# Patient Record
Sex: FEMALE | Race: WHITE | Employment: FULL TIME | URBAN - NONMETROPOLITAN AREA
[De-identification: names, ages, dates, MRNs, and addresses within clinical notes are randomized per-mention and may not be internally consistent; named-entity substitution may affect disease eponyms.]

---

## 2017-05-19 ENCOUNTER — HOSPITAL ENCOUNTER (OUTPATIENT)
Dept: ULTRASOUND IMAGING | Age: 27
Discharge: HOME | End: 2017-05-19
Payer: COMMERCIAL

## 2017-05-19 DIAGNOSIS — R59.1 LYMPHADENOPATHY: ICD-10-CM

## 2017-05-19 PROCEDURE — 76536 US EXAM OF HEAD AND NECK: CPT

## 2019-12-11 ENCOUNTER — OFFICE VISIT (OUTPATIENT)
Dept: FAMILY MEDICINE CLINIC | Age: 29
End: 2019-12-11
Payer: COMMERCIAL

## 2019-12-11 ENCOUNTER — HOSPITAL ENCOUNTER (OUTPATIENT)
Age: 29
Discharge: HOME | End: 2019-12-11
Payer: COMMERCIAL

## 2019-12-11 VITALS
HEIGHT: 62 IN | SYSTOLIC BLOOD PRESSURE: 126 MMHG | DIASTOLIC BLOOD PRESSURE: 80 MMHG | BODY MASS INDEX: 39.56 KG/M2 | WEIGHT: 215 LBS

## 2019-12-11 DIAGNOSIS — K62.5 RECTAL BLEED: ICD-10-CM

## 2019-12-11 DIAGNOSIS — K62.5 RECTAL BLEED: Primary | ICD-10-CM

## 2019-12-11 LAB
ABSOLUTE EOS #: 0.51 K/UL (ref 0–0.44)
ABSOLUTE IMMATURE GRANULOCYTE: 0.04 K/UL (ref 0–0.3)
ABSOLUTE LYMPH #: 2.63 K/UL (ref 1.1–3.7)
ABSOLUTE MONO #: 0.92 K/UL (ref 0.1–1.2)
ALBUMIN SERPL-MCNC: 4.4 G/DL (ref 3.5–5.2)
ALBUMIN/GLOBULIN RATIO: 1.6 (ref 1–2.5)
ALP BLD-CCNC: 111 U/L (ref 35–104)
ALT SERPL-CCNC: 22 U/L (ref 5–33)
ANION GAP SERPL CALCULATED.3IONS-SCNC: 9 MMOL/L (ref 9–17)
AST SERPL-CCNC: 21 U/L
BASOPHILS # BLD: 1 % (ref 0–2)
BASOPHILS ABSOLUTE: 0.08 K/UL (ref 0–0.2)
BILIRUB SERPL-MCNC: 0.18 MG/DL (ref 0.3–1.2)
BUN BLDV-MCNC: 10 MG/DL (ref 6–20)
BUN/CREAT BLD: 17 (ref 9–20)
CALCIUM SERPL-MCNC: 9.5 MG/DL (ref 8.6–10.4)
CHLORIDE BLD-SCNC: 102 MMOL/L (ref 98–107)
CO2: 27 MMOL/L (ref 20–31)
CREAT SERPL-MCNC: 0.6 MG/DL (ref 0.5–0.9)
DIFFERENTIAL TYPE: ABNORMAL
EOSINOPHILS RELATIVE PERCENT: 5 % (ref 1–4)
GFR AFRICAN AMERICAN: >60 ML/MIN
GFR NON-AFRICAN AMERICAN: >60 ML/MIN
GFR SERPL CREATININE-BSD FRML MDRD: ABNORMAL ML/MIN/{1.73_M2}
GFR SERPL CREATININE-BSD FRML MDRD: ABNORMAL ML/MIN/{1.73_M2}
GLUCOSE BLD-MCNC: 92 MG/DL (ref 70–99)
HCT VFR BLD CALC: 40.1 % (ref 36.3–47.1)
HEMOGLOBIN: 13 G/DL (ref 11.9–15.1)
IMMATURE GRANULOCYTES: 0 %
LYMPHOCYTES # BLD: 26 % (ref 24–43)
MCH RBC QN AUTO: 28.6 PG (ref 25.2–33.5)
MCHC RBC AUTO-ENTMCNC: 32.4 G/DL (ref 28.4–34.8)
MCV RBC AUTO: 88.1 FL (ref 82.6–102.9)
MONOCYTES # BLD: 9 % (ref 3–12)
NRBC AUTOMATED: 0 PER 100 WBC
PDW BLD-RTO: 13.1 % (ref 11.8–14.4)
PLATELET # BLD: 333 K/UL (ref 138–453)
PLATELET ESTIMATE: ABNORMAL
PMV BLD AUTO: 10.3 FL (ref 8.1–13.5)
POTASSIUM SERPL-SCNC: 4 MMOL/L (ref 3.7–5.3)
RBC # BLD: 4.55 M/UL (ref 3.95–5.11)
RBC # BLD: ABNORMAL 10*6/UL
SEG NEUTROPHILS: 59 % (ref 36–65)
SEGMENTED NEUTROPHILS ABSOLUTE COUNT: 6.07 K/UL (ref 1.5–8.1)
SODIUM BLD-SCNC: 138 MMOL/L (ref 135–144)
TOTAL PROTEIN: 7.1 G/DL (ref 6.4–8.3)
WBC # BLD: 10.3 K/UL (ref 3.5–11.3)
WBC # BLD: ABNORMAL 10*3/UL

## 2019-12-11 PROCEDURE — 36415 COLL VENOUS BLD VENIPUNCTURE: CPT

## 2019-12-11 PROCEDURE — 85025 COMPLETE CBC W/AUTO DIFF WBC: CPT

## 2019-12-11 PROCEDURE — 99204 OFFICE O/P NEW MOD 45 MIN: CPT | Performed by: FAMILY MEDICINE

## 2019-12-11 PROCEDURE — 80053 COMPREHEN METABOLIC PANEL: CPT

## 2019-12-11 ASSESSMENT — PATIENT HEALTH QUESTIONNAIRE - PHQ9
1. LITTLE INTEREST OR PLEASURE IN DOING THINGS: 0
2. FEELING DOWN, DEPRESSED OR HOPELESS: 0
SUM OF ALL RESPONSES TO PHQ QUESTIONS 1-9: 0
SUM OF ALL RESPONSES TO PHQ QUESTIONS 1-9: 0
SUM OF ALL RESPONSES TO PHQ9 QUESTIONS 1 & 2: 0

## 2019-12-13 ENCOUNTER — TELEPHONE (OUTPATIENT)
Dept: FAMILY MEDICINE CLINIC | Age: 29
End: 2019-12-13

## 2019-12-16 ENCOUNTER — TELEPHONE (OUTPATIENT)
Dept: GASTROENTEROLOGY | Facility: CLINIC | Age: 29
End: 2019-12-16

## 2019-12-16 DIAGNOSIS — K62.5 RECTAL BLEEDING: Primary | ICD-10-CM

## 2019-12-20 PROBLEM — K62.5 RECTAL BLEEDING: Status: ACTIVE | Noted: 2019-12-20

## 2019-12-20 RX ORDER — SODIUM, POTASSIUM,MAG SULFATES 17.5-3.13G
SOLUTION, RECONSTITUTED, ORAL ORAL
Qty: 2 BOTTLE | Refills: 0 | Status: ON HOLD | OUTPATIENT
Start: 2019-12-20 | End: 2019-12-30 | Stop reason: HOSPADM

## 2019-12-30 ENCOUNTER — ANESTHESIA (OUTPATIENT)
Dept: OPERATING ROOM | Age: 29
End: 2019-12-30
Payer: COMMERCIAL

## 2019-12-30 ENCOUNTER — HOSPITAL ENCOUNTER (OUTPATIENT)
Age: 29
Setting detail: OUTPATIENT SURGERY
Discharge: HOME | End: 2019-12-30
Attending: INTERNAL MEDICINE | Admitting: INTERNAL MEDICINE
Payer: COMMERCIAL

## 2019-12-30 ENCOUNTER — ANESTHESIA EVENT (OUTPATIENT)
Dept: OPERATING ROOM | Age: 29
End: 2019-12-30
Payer: COMMERCIAL

## 2019-12-30 VITALS
OXYGEN SATURATION: 98 % | TEMPERATURE: 97.9 F | HEIGHT: 62 IN | BODY MASS INDEX: 34.96 KG/M2 | HEART RATE: 65 BPM | SYSTOLIC BLOOD PRESSURE: 122 MMHG | DIASTOLIC BLOOD PRESSURE: 72 MMHG | WEIGHT: 190 LBS | RESPIRATION RATE: 16 BRPM

## 2019-12-30 VITALS
OXYGEN SATURATION: 97 % | RESPIRATION RATE: 13 BRPM | DIASTOLIC BLOOD PRESSURE: 82 MMHG | SYSTOLIC BLOOD PRESSURE: 124 MMHG

## 2019-12-30 PROCEDURE — 45378 DIAGNOSTIC COLONOSCOPY: CPT | Performed by: INTERNAL MEDICINE

## 2019-12-30 PROCEDURE — 2500000003 HC RX 250 WO HCPCS: Performed by: NURSE ANESTHETIST, CERTIFIED REGISTERED

## 2019-12-30 PROCEDURE — 2580000003 HC RX 258: Performed by: INTERNAL MEDICINE

## 2019-12-30 PROCEDURE — 3609027000 HC COLONOSCOPY: Performed by: INTERNAL MEDICINE

## 2019-12-30 PROCEDURE — 7100000011 HC PHASE II RECOVERY - ADDTL 15 MIN: Performed by: INTERNAL MEDICINE

## 2019-12-30 PROCEDURE — 6360000002 HC RX W HCPCS: Performed by: NURSE ANESTHETIST, CERTIFIED REGISTERED

## 2019-12-30 PROCEDURE — 3700000000 HC ANESTHESIA ATTENDED CARE: Performed by: INTERNAL MEDICINE

## 2019-12-30 PROCEDURE — 2709999900 HC NON-CHARGEABLE SUPPLY: Performed by: INTERNAL MEDICINE

## 2019-12-30 PROCEDURE — 7100000010 HC PHASE II RECOVERY - FIRST 15 MIN: Performed by: INTERNAL MEDICINE

## 2019-12-30 PROCEDURE — 3700000001 HC ADD 15 MINUTES (ANESTHESIA): Performed by: INTERNAL MEDICINE

## 2019-12-30 RX ORDER — SODIUM CHLORIDE, SODIUM LACTATE, POTASSIUM CHLORIDE, CALCIUM CHLORIDE 600; 310; 30; 20 MG/100ML; MG/100ML; MG/100ML; MG/100ML
INJECTION, SOLUTION INTRAVENOUS CONTINUOUS
Status: DISCONTINUED | OUTPATIENT
Start: 2019-12-30 | End: 2019-12-30 | Stop reason: HOSPADM

## 2019-12-30 RX ORDER — LIDOCAINE HYDROCHLORIDE 20 MG/ML
INJECTION, SOLUTION EPIDURAL; INFILTRATION; INTRACAUDAL; PERINEURAL PRN
Status: DISCONTINUED | OUTPATIENT
Start: 2019-12-30 | End: 2019-12-30 | Stop reason: SDUPTHER

## 2019-12-30 RX ORDER — PROPOFOL 10 MG/ML
INJECTION, EMULSION INTRAVENOUS PRN
Status: DISCONTINUED | OUTPATIENT
Start: 2019-12-30 | End: 2019-12-30 | Stop reason: SDUPTHER

## 2019-12-30 RX ORDER — PROPOFOL 10 MG/ML
INJECTION, EMULSION INTRAVENOUS CONTINUOUS PRN
Status: DISCONTINUED | OUTPATIENT
Start: 2019-12-30 | End: 2019-12-30 | Stop reason: SDUPTHER

## 2019-12-30 RX ADMIN — LIDOCAINE HYDROCHLORIDE 100 MG: 20 INJECTION, SOLUTION EPIDURAL; INFILTRATION; INTRACAUDAL at 10:09

## 2019-12-30 RX ADMIN — PROPOFOL 200 MCG/KG/MIN: 10 INJECTION, EMULSION INTRAVENOUS at 10:09

## 2019-12-30 RX ADMIN — PROPOFOL 50 MG: 10 INJECTION, EMULSION INTRAVENOUS at 10:15

## 2019-12-30 RX ADMIN — PROPOFOL 100 MG: 10 INJECTION, EMULSION INTRAVENOUS at 10:09

## 2019-12-30 RX ADMIN — SODIUM CHLORIDE, POTASSIUM CHLORIDE, SODIUM LACTATE AND CALCIUM CHLORIDE: 600; 310; 30; 20 INJECTION, SOLUTION INTRAVENOUS at 09:26

## 2019-12-30 ASSESSMENT — PAIN SCALES - GENERAL
PAINLEVEL_OUTOF10: 0
PAINLEVEL_OUTOF10: 0

## 2019-12-30 ASSESSMENT — LIFESTYLE VARIABLES: SMOKING_STATUS: 0

## 2019-12-30 ASSESSMENT — PAIN - FUNCTIONAL ASSESSMENT: PAIN_FUNCTIONAL_ASSESSMENT: 0-10

## 2020-12-08 ENCOUNTER — OFFICE VISIT (OUTPATIENT)
Dept: FAMILY MEDICINE CLINIC | Age: 30
End: 2020-12-08
Payer: COMMERCIAL

## 2020-12-08 VITALS
WEIGHT: 209 LBS | BODY MASS INDEX: 38.46 KG/M2 | DIASTOLIC BLOOD PRESSURE: 74 MMHG | HEIGHT: 62 IN | SYSTOLIC BLOOD PRESSURE: 116 MMHG

## 2020-12-08 PROCEDURE — 99395 PREV VISIT EST AGE 18-39: CPT | Performed by: FAMILY MEDICINE

## 2020-12-08 SDOH — ECONOMIC STABILITY: INCOME INSECURITY: HOW HARD IS IT FOR YOU TO PAY FOR THE VERY BASICS LIKE FOOD, HOUSING, MEDICAL CARE, AND HEATING?: NOT HARD AT ALL

## 2020-12-08 SDOH — ECONOMIC STABILITY: FOOD INSECURITY: WITHIN THE PAST 12 MONTHS, YOU WORRIED THAT YOUR FOOD WOULD RUN OUT BEFORE YOU GOT MONEY TO BUY MORE.: NEVER TRUE

## 2020-12-08 SDOH — ECONOMIC STABILITY: FOOD INSECURITY: WITHIN THE PAST 12 MONTHS, THE FOOD YOU BOUGHT JUST DIDN'T LAST AND YOU DIDN'T HAVE MONEY TO GET MORE.: NEVER TRUE

## 2020-12-08 ASSESSMENT — PATIENT HEALTH QUESTIONNAIRE - PHQ9
SUM OF ALL RESPONSES TO PHQ QUESTIONS 1-9: 0
2. FEELING DOWN, DEPRESSED OR HOPELESS: 0
SUM OF ALL RESPONSES TO PHQ9 QUESTIONS 1 & 2: 0
SUM OF ALL RESPONSES TO PHQ QUESTIONS 1-9: 0
1. LITTLE INTEREST OR PLEASURE IN DOING THINGS: 0
SUM OF ALL RESPONSES TO PHQ QUESTIONS 1-9: 0

## 2020-12-08 NOTE — PATIENT INSTRUCTIONS
PLAN:  She continues on allegra for management of allergic rhinitis. We discuss her hysterectomy last year at [facility] due to pain that she was having. Her GYN said it appeared that she had had a bowel obstruction at some point causing her fallopian tubes to be adhered to her bowels. She had a colonoscopy last year and denies any recent flare ups in the hemorrhoids. She has a family history of breast cancer (maternal grandmother and mother). I recommend that she make sure she is doing self breast exams routinely to evaluate for lumps/masses. Genetic testing may also be prudent. Her GYN recommended mammograms starting at age 28. I would recommend that she get breast MRI alternatively as in younger women, the mammograms are more difficult to read. Weight is her biggest risk at this time. I encourage her to have a long-term goal to keep her BMI down below 30 through aerobic activity. We discuss correlations between sleep apnea and obesity. She denies any difficulty sleeping at this time. If the dermatofibroma become bothersome, I can excise these. I will have her schedule to come back and have the sebaceous cysts excised 2 at a time. I will see her back in 1 year or sooner. SURVEY:    You may be receiving a survey from [employer] regarding your visit today. Please complete the survey to enable us to provide the highest quality of care to you and your family. If you cannot score us a very good on any question, please call the office to discuss how we could of made your experience a very good one. Thank you.

## 2020-12-08 NOTE — PROGRESS NOTES
Rebekah WILLAMS, ISAURA, am scribing for and in the presence of Dr. Genoveva Cunningham. 12/08/2020 8:40 am [address] [facility] [address] [facility]  [place] [address]  Dept: [phone]    Katheryn Mancia is a 27 y.o. female here for Annual Exam      HPI:  Pt. Presents for annual wellness. Prior to Admission medications    Medication Sig Start Date End Date Taking? Authorizing Provider   Chlorpheniramine Maleate (ALLERGY PO) Take by mouth daily    Historical Provider, MD       ROS:  General Constitutional: Denies chills. Denies fever. Denies headache. Denies lightheadedness. Ophthalmologic: Denies blurred vision. ENT: Denies nasal congestion. Denies sore throat. Denies ear pain and pressure. Respiratory: Denies cough. Denies shortness of breath. Denies wheezing. Cardiovascular: Denies chest pain at rest. Denies irregular heartbeat. Denies palpitations. Gastrointestinal: Denies abdominal pain. Denies blood in the stool. Denies constipation. Denies diarrhea. Denies nausea. Denies vomiting. Genitourinary: Denies blood in the urine. Denies difficulty urinating. Denies frequent urination. Denies painful urination. Denies urinary incontinence. Musculoskeletal: Denies muscle aches. Denies painful joints. Denies swollen joints. Peripheral Vascular: Denies pain/cramping in legs after exertion. Skin: Denies dry skin. Denies itching. Denies rash. Getting more sebaceous cysts on scalp, arm, leg. Get irritated and inflamed when brushing and washing hair  Neurologic: Denies falls. Denies dizziness. Denies fainting. Denies tingling/numbness. Psychiatric: Denies sleep disturbance. Denies anxiety. Denies depressed mood.     Past Surgical History:   Procedure Laterality Date    COLONOSCOPY N/A 12/30/2019    COLONOSCOPY DIAGNOSTIC performed by Davis Reese MD at [address] [place] [address]  12/30/2019    -hemorrhoids    COLPOSCOPY  8/14/2012    HYSTERECTOMY  04/2018    Dr. Maria Teresa De La Cruz ovaries present    KNEE SURGERY      Right       Family History   Problem Relation Age of Onset    Breast Cancer Mother     Breast Cancer Maternal Grandmother        Past Medical History:   Diagnosis Date    ASCUS with positive high risk HPV 7/26/2012      Social History     Tobacco Use    Smoking status: Never Smoker    Smokeless tobacco: Never Used   Substance Use Topics    Alcohol use: Yes     Alcohol/week: 0.0 standard drinks     Comment: occ      Current Outpatient Medications   Medication Sig Dispense Refill    Chlorpheniramine Maleate (ALLERGY PO) Take by mouth daily       No current facility-administered medications for this visit. Allergies   Allergen Reactions    Tape [facility] Tape]      Paper tape       PHYSICAL EXAM:    /74   Ht 5' 2\" (1.575 m)   Wt 209 lb (94.8 kg)   LMP 10/03/2016   BMI 38.23 kg/m²   Wt Readings from Last 3 Encounters:   12/08/20 209 lb (94.8 kg)   12/30/19 190 lb (86.2 kg)   12/11/19 215 lb (97.5 kg)     BP Readings from Last 3 Encounters:   12/08/20 116/74   12/30/19 122/72   12/30/19 124/82       General Appearance: in no acute distress, well developed, well nourished. Eyes: pupils equal, round reactive to light and accommodation. Ears: normal canal and TM's. Nose: nares patent, no lesions. Oral Cavity: mucosa moist.  Throat: clear. Neck/Thyroid: neck supple, full range of motion, no cervical lymphadenopathy, no thyromegaly or carotid bruits. Skin: warm and dry. posterior temporal region 1 cm sebaceous cyst, right midline frontal region, 3 on midline and to right frontal parietal region. Dermatofibroma 1/2 cm on right arm posteriorly above elbow. Dermatofibroma right posterior thigh above popliteal space. Heart: regular rate and rhythm. No murmurs. S1, S2 normal, no gallops. Rate: 80   Lungs: clear to auscultation bilaterally. Abdomen: bowel sounds present, soft, nontender, nondistended, no masses or organomegaly. Obese.    Musculoskeletal: normal, full range of motion in knees and hips, no swelling or tenderness. Extremities: no cyanosis or edema. Peripheral Pulses: 2+ throughout, symetric. Neurologic: nonfocal, motor strength normal upper and lower extremities, sensory exam intact. Psych: normal affect, speech fluent. ASSESSMENT:   Diagnosis Orders   1. Wellness examination  Lipid Panel    Hemoglobin A1C    Comprehensive Metabolic Panel    CBC With Auto Differential   2. Internal hemorrhoids     3. Class 2 obesity due to excess calories without serious comorbidity with body mass index (BMI) of 37.0 to 37.9 in adult     4. Family history of breast cancer in mother     11. Sebaceous cyst     6. Dermatofibroma         PLAN:  She continues on allegra for management of allergic rhinitis. We discuss her hysterectomy last year at [facility] due to pain that she was having. Her GYN said it appeared that she had had a bowel obstruction at some point causing her fallopian tubes to be adhered to her bowels. She had a colonoscopy last year and denies any recent flare ups in the hemorrhoids. She has a family history of breast cancer (maternal grandmother and mother). I recommend that she make sure she is doing self breast exams routinely to evaluate for lumps/masses. Genetic testing may also be prudent. Her GYN recommended mammograms starting at age 28. I would recommend that she get breast MRI alternatively as in younger women, the mammograms are more difficult to read. Weight is her biggest risk at this time. I encourage her to have a long-term goal to keep her BMI down below 30 through aerobic activity. We discuss correlations between sleep apnea and obesity. She denies any difficulty sleeping at this time. If the dermatofibroma become bothersome, I can excise these. I will have her schedule to come back and have the sebaceous cysts excised 2 at a time. I will see her back in 1 year or sooner.    Orders Placed This Encounter Procedures    Lipid Panel     Standing Status:   Future     Standing Expiration Date:   12/8/2021     Order Specific Question:   Is Patient Fasting?/# of Hours     Answer:   no fasting required    Hemoglobin A1C     Standing Status:   Future     Standing Expiration Date:   12/8/2021    Comprehensive Metabolic Panel     Standing Status:   Future     Standing Expiration Date:   12/8/2021    CBC With Auto Differential     Standing Status:   Future     Standing Expiration Date:   12/8/2021     No orders of the defined types were placed in this encounter. I, Dr. Jyotsna Staley, personally performed the services described in this documentation as scribed by ISAURA Domínguez in my presence, and is both accurate and complete.

## 2021-04-20 ENCOUNTER — PROCEDURE VISIT (OUTPATIENT)
Dept: FAMILY MEDICINE CLINIC | Age: 31
End: 2021-04-20
Payer: COMMERCIAL

## 2021-04-20 ENCOUNTER — HOSPITAL ENCOUNTER (OUTPATIENT)
Age: 31
Setting detail: SPECIMEN
Discharge: HOME | End: 2021-04-20
Payer: COMMERCIAL

## 2021-04-20 DIAGNOSIS — D23.9 DERMATOFIBROMA: Primary | ICD-10-CM

## 2021-04-20 PROCEDURE — 11401 EXC TR-EXT B9+MARG 0.6-1 CM: CPT | Performed by: FAMILY MEDICINE

## 2021-04-20 PROCEDURE — 88305 TISSUE EXAM BY PATHOLOGIST: CPT

## 2021-04-21 LAB
ABSOLUTE BASO #: 0.1 X10E9/L (ref 0–0.2)
ABSOLUTE EOS #: 0.3 X10E9/L (ref 0–0.4)
ABSOLUTE LYMPH #: 2.4 X10E9/L (ref 1–3.5)
ABSOLUTE MONO #: 0.6 X10E9/L (ref 0–0.9)
ABSOLUTE NEUT #: 5.3 X10E9/L (ref 1.5–6.6)
ALBUMIN SERPL-MCNC: 4.5 G/DL (ref 3.2–5.3)
ALK PHOSPHATASE: 115 U/L (ref 39–130)
ALT SERPL-CCNC: 20 U/L (ref 0–31)
ANION GAP SERPL CALCULATED.3IONS-SCNC: 8 MMOL/L (ref 5–15)
AST SERPL-CCNC: 19 U/L (ref 0–41)
AVERAGE GLUCOSE: 105 MG/DL
BASOPHILS RELATIVE PERCENT: 0.9 %
BILIRUB SERPL-MCNC: 0.5 MG/DL (ref 0.3–1.2)
BUN BLDV-MCNC: 10 MG/DL (ref 5–23)
CALCIUM SERPL-MCNC: 9 MG/DL (ref 8.5–10.5)
CHLORIDE BLD-SCNC: 105 MMOL/L (ref 98–109)
CHOLESTEROL/HDL RATIO: 4.1 (ref 1–5)
CHOLESTEROL: 219 MG/DL (ref 150–200)
CO2: 27 MMOL/L (ref 22–32)
CREAT SERPL-MCNC: 0.71 MG/DL (ref 0.4–1)
EGFR AFRICAN AMERICAN: >60 ML/MIN/1.73SQ.M
EGFR IF NONAFRICAN AMERICAN: >60 ML/MIN/1.73SQ.M
EOSINOPHILS RELATIVE PERCENT: 3.7 %
GLUCOSE: 95 MG/DL (ref 65–99)
HBA1C MFR BLD: 5.3 % (ref 4.4–6.4)
HCT VFR BLD CALC: 42.2 % (ref 35–47)
HDLC SERPL-MCNC: 54 MG/DL
HEMOGLOBIN: 13.7 G/DL (ref 11.7–15.5)
LDL CHOLESTEROL CALCULATED: 145 MG/DL
LDL/HDL RATIO: 2.7
LYMPHOCYTE %: 27.4 %
MCH RBC QN AUTO: 28 PG (ref 27–34)
MCHC RBC AUTO-ENTMCNC: 32.4 G/DL (ref 32–36)
MCV RBC AUTO: 86 FL (ref 80–100)
MONOCYTES # BLD: 7.1 %
NEUTROPHILS RELATIVE PERCENT: 60.9 %
PDW BLD-RTO: 14 % (ref 11.5–15)
PLATELETS: 316 X10E9/L (ref 150–450)
PMV BLD AUTO: 9.4 FL (ref 7–12)
POTASSIUM SERPL-SCNC: 3.7 MMOL/L (ref 3.5–5)
RBC: 4.89 X10E12/L (ref 3.8–5.2)
SODIUM BLD-SCNC: 140 MMOL/L (ref 134–146)
TOTAL PROTEIN: 7 G/DL (ref 6–8)
TRIGL SERPL-MCNC: 102 MG/DL (ref 27–150)
VLDLC SERPL CALC-MCNC: 20 MG/DL (ref 0–30)
WBC: 8.6 X10E9/L (ref 4–11)

## 2021-04-22 LAB — DERMATOLOGY PATHOLOGY REPORT: NORMAL

## 2021-04-29 ENCOUNTER — PROCEDURE VISIT (OUTPATIENT)
Dept: FAMILY MEDICINE CLINIC | Age: 31
End: 2021-04-29
Payer: COMMERCIAL

## 2021-04-29 VITALS
TEMPERATURE: 97.3 F | OXYGEN SATURATION: 98 % | HEIGHT: 62 IN | HEART RATE: 64 BPM | SYSTOLIC BLOOD PRESSURE: 122 MMHG | WEIGHT: 213 LBS | DIASTOLIC BLOOD PRESSURE: 64 MMHG | RESPIRATION RATE: 17 BRPM | BODY MASS INDEX: 39.2 KG/M2

## 2021-04-29 DIAGNOSIS — Z48.02 VISIT FOR SUTURE REMOVAL: Primary | ICD-10-CM

## 2021-04-29 PROCEDURE — S0630 REMOVAL OF SUTURES: HCPCS | Performed by: NURSE PRACTITIONER

## 2021-04-29 NOTE — PROGRESS NOTES
Name: Nino Dakins  : 1990         Chief Complaint:     Chief Complaint   Patient presents with    Suture / Staple Removal      had cyst removed from behind R knee, 4 4-0 sutures placed    Other     Pt. states she has another cyst she is wanting removed on lateral side of R knee       History of Present Illness:      Nino Dakins is a 32 y.o.  female who presents with Suture / Staple Removal ( had cyst removed from behind R knee, 4 4-0 sutures placed) and Other (Pt. states she has another cyst she is wanting removed on lateral side of R knee)      HPI      Past Medical History:     Past Medical History:   Diagnosis Date    ASCUS with positive high risk HPV 2012      Reviewed all health maintenance requirements and ordered appropriate tests  Health Maintenance Due   Topic Date Due    Hepatitis C screen  Never done    Varicella vaccine (1 of 2 - 2-dose childhood series) Never done    COVID-19 Vaccine (1) Never done    DTaP/Tdap/Td vaccine (1 - Tdap) Never done    Cervical cancer screen  2018       Past Surgical History:     Past Surgical History:   Procedure Laterality Date    COLONOSCOPY N/A 2019    COLONOSCOPY DIAGNOSTIC performed by Indra Wheeler MD at [address] [place] [address]  2019    -hemorrhoids    COLPOSCOPY  2012    HYSTERECTOMY  2018    Dr. Silas Saldivar ovaries present    KNEE SURGERY      Right        Medications:       Prior to Admission medications    Medication Sig Start Date End Date Taking? Authorizing Provider   Chlorpheniramine Maleate (ALLERGY PO) Take by mouth daily   Yes Historical Provider, MD        Allergies:       Tape [place]  tape]    Social History:     Tobacco:    reports that she has never smoked. She has never used smokeless tobacco.  Alcohol:      reports current alcohol use. Drug Use:  reports no history of drug use.     Family History:     Family History   Problem Relation Age of Onset   Shanthi Antunez Breast Cancer Mother    Shanthi Antunez Breast Cancer Maternal Grandmother        Review of Systems:     Positive and Negative as described in HPI    Review of Systems    Physical Exam:   Vitals:  /64 (Site: Left Upper Arm, Position: Sitting, Cuff Size: Large Adult)   Pulse 64   Temp 97.3 °F (36.3 °C) (Tympanic)   Resp 17   Ht 5' 2\" (1.575 m)   Wt 213 lb (96.6 kg)   LMP 10/03/2016   SpO2 98%   BMI 38.96 kg/m²     Physical Exam    Data:     Lab Results   Component Value Date     04/20/2021    K 3.7 04/20/2021     04/20/2021    CO2 27 04/20/2021    BUN 10 04/20/2021    CREATININE 0.71 04/20/2021    GLUCOSE 95 04/20/2021    PROT 7.0 04/20/2021    LABALBU 4.5 04/20/2021    BILITOT 0.5 04/20/2021    ALKPHOS 115 04/20/2021    ALKPHOS 111 12/11/2019    AST 19 04/20/2021    ALT 20 04/20/2021     Lab Results   Component Value Date    WBC 8.6 04/20/2021    WBC 10.3 12/11/2019    RBC 4.89 04/20/2021    HGB 13.7 04/20/2021    HCT 42.2 04/20/2021    MCV 86 04/20/2021    MCH 28.0 04/20/2021    MCHC 32.4 04/20/2021    RDW 14.0 04/20/2021     04/20/2021     12/11/2019    MPV 9.4 04/20/2021     No results found for: TSH  Lab Results   Component Value Date    CHOL 219 04/20/2021    HDL 54 04/20/2021    LABA1C 5.3 04/20/2021       Assessment/Plan:     {No diagnosis found. (Refresh or delete this SmartLink)}        Completed Refills   Requested Prescriptions      No prescriptions requested or ordered in this encounter       No orders of the defined types were placed in this encounter. No results found for this visit on 04/29/21. No follow-ups on file.     Electronically signed by GITA Goetz CNP on 04/29/21 at 8:18 AM.

## 2021-04-29 NOTE — PROGRESS NOTES
[address] [address]  [place] [address]  Dept: [phone]    SUBJECTIVE:    Chief Complaint   Patient presents with    Suture / Staple Removal     4/20 had cyst removed from behind R knee, 4 4-0 sutures placed    Other     Pt. states she has another cyst she is wanting removed on lateral side of R knee       HPI:  Patient presents today for suture removal. She had cyst removed behind right knee on 4/20/21. 4 4-0 sutures were placed. She denies concerns with her sutures today. Denies redness, bleeding, or discharge. She has been caring for her sutures by showering and leaving the area uncovered. Denies pain. ROS:  she denies any issues with the sutures    OBJECTIVE:    EXAM:  Derm: Well approximated incision without surrounding erythema, discharge, edema, or bleeding present right popliteal space. ASSESSMENT:     Diagnosis Orders   1. Visit for suture removal           PLAN:    The sutures were removed without difficulty and the patient tolerated the procedure well. Reviewed signs and symptoms of infection and when to notify office. She will schedule appointment to return to have second cyst removed by Dr. Victor Manuel Ivy.     Performed by: Vini Ramírez CNP

## 2021-05-03 ENCOUNTER — PROCEDURE VISIT (OUTPATIENT)
Dept: FAMILY MEDICINE CLINIC | Age: 31
End: 2021-05-03
Payer: COMMERCIAL

## 2021-05-03 ENCOUNTER — HOSPITAL ENCOUNTER (OUTPATIENT)
Age: 31
Setting detail: SPECIMEN
Discharge: HOME | End: 2021-05-03
Payer: COMMERCIAL

## 2021-05-03 DIAGNOSIS — L72.3 SEBACEOUS CYST: Primary | ICD-10-CM

## 2021-05-03 PROCEDURE — 88305 TISSUE EXAM BY PATHOLOGIST: CPT

## 2021-05-03 PROCEDURE — 11104 PUNCH BX SKIN SINGLE LESION: CPT | Performed by: FAMILY MEDICINE

## 2021-05-03 NOTE — PROGRESS NOTES
Adiel WILLAMS RMA, am scribing for and in the presence of Dr. Rhiannon Pineda. 05/03/2021 3:00 pm [address]  [place] [address]  Dept: [phone]    EXCISIONAL BIOPSY PROCEDURE NOTE    PRE-OP DIAGNOSIS:  Dermatofibroma                                          The lesions has been irritated, inflamed and painful               PROCEDURE:  Skin Lesion Excision(s)    Lesion description: nodular lesion R lateral thigh. INDICATIONS:  Soto Watson is a 32 y.o. female who presents for minor skin surgery for changing and concerning skin lesion(s). The patient understands all risks, benefits, indications, potential complications, and alternatives, and freely consents for the procedure. The patient also understands the option of performing no surgery, the risk for scarring, and the technique of the procedure. TECHNIQUE:  After informed consent was obtained and after the skin was prepped with Betadine and alcohol and draped in the usual sterile fashion Lidocaine with epinephrine was used as local anesthesia. An incision was made with a 4mm punch biopsy and 10 blade to excise   Bleeding was controlled with ferric subsulfate. A dressing was applied and wound care instructions were provided. she tolerated the procedure well and without complications. The patient will be alert for any signs of cutaneous infection and will follow up as instructed. Plan:  1. Instructed to keep the wound dry and covered for 24-48h and clean thereafter. 2. Warning signs of infection were reviewed.     3. We will call the patient with the biopsy results

## 2021-05-05 LAB — DERMATOLOGY PATHOLOGY REPORT: NORMAL

## 2021-05-11 ENCOUNTER — PROCEDURE VISIT (OUTPATIENT)
Dept: FAMILY MEDICINE CLINIC | Age: 31
End: 2021-05-11
Payer: COMMERCIAL

## 2021-05-11 ENCOUNTER — HOSPITAL ENCOUNTER (OUTPATIENT)
Age: 31
Setting detail: SPECIMEN
Discharge: HOME | End: 2021-05-11
Payer: COMMERCIAL

## 2021-05-11 DIAGNOSIS — D23.61 DERMATOFIBROMA OF UPPER ARM, RIGHT: Primary | ICD-10-CM

## 2021-05-11 DIAGNOSIS — L72.3 SEBACEOUS CYST: ICD-10-CM

## 2021-05-11 PROCEDURE — 11104 PUNCH BX SKIN SINGLE LESION: CPT | Performed by: FAMILY MEDICINE

## 2021-05-11 PROCEDURE — 88305 TISSUE EXAM BY PATHOLOGIST: CPT

## 2021-05-11 NOTE — PROGRESS NOTES
I, ISAURA Leonard, am scribing for and in the presence of Dr. Shanel Soto. 05/11/2021 3:31 pm [facility]  [place] [address]  Dept: [phone]    EXCISIONAL BIOPSY PROCEDURE NOTE    PRE-OP DIAGNOSIS:  dermatofibroma                                          The lesions has been irritated, inflamed and painful               PROCEDURE:  Skin Lesion Excision(s)    Lesion description: 6 cm dermatofibroma, R upper posterior arm    INDICATIONS:  Remington Draper is a 32 y.o. female who presents for minor skin surgery for changing and concerning skin lesion(s). The patient understands all risks, benefits, indications, potential complications, and alternatives, and freely consents for the procedure. The patient also understands the option of performing no surgery, the risk for scarring, and the technique of the procedure. TECHNIQUE:  After informed consent was obtained and after the skin was prepped with Betadine and alcohol and draped in the usual sterile fashion Lidocaine with epinephrine was used as local anesthesia. An incision was made with a 6 mm punch, the lesion was excised and sent for pathologic evaluation. 3 4-0 prolene sutures were placed to approximate the wound edges. A dressing was applied and wound care instructions were provided. she tolerated the procedure well and without complications. The patient will be alert for any signs of cutaneous infection and will follow up as instructed. Plan:  1. Instructed to keep the wound dry and covered for 24-48h and clean thereafter. 2. Warning signs of infection were reviewed. 3. We will call the patient with the biopsy results    I will refer her to Dr. Brian Joyner for excision of the sebaceous cysts on her scalp.

## 2021-05-14 LAB — DERMATOLOGY PATHOLOGY REPORT: NORMAL

## 2021-07-14 ENCOUNTER — OFFICE VISIT (OUTPATIENT)
Dept: SURGERY | Age: 31
End: 2021-07-14
Payer: COMMERCIAL

## 2021-07-14 VITALS
SYSTOLIC BLOOD PRESSURE: 146 MMHG | WEIGHT: 209 LBS | DIASTOLIC BLOOD PRESSURE: 95 MMHG | BODY MASS INDEX: 38.46 KG/M2 | HEIGHT: 62 IN | HEART RATE: 71 BPM | TEMPERATURE: 97.2 F

## 2021-07-14 DIAGNOSIS — L72.11 PILAR AND TRICHILEMMAL CYSTS: Primary | ICD-10-CM

## 2021-07-14 DIAGNOSIS — L72.12 PILAR AND TRICHILEMMAL CYSTS: Primary | ICD-10-CM

## 2021-07-14 PROCEDURE — 99203 OFFICE O/P NEW LOW 30 MIN: CPT | Performed by: SURGERY

## 2021-07-14 NOTE — PROGRESS NOTES
noncontributory  Cardiovascular:  Completed and, except as mentioned above, was negative or noncontributory  Gastrointestinal: Completed and, except as mentioned above, was negative or noncontributory  Genito-Urinary:  Completed and, except as mentioned above, was negative or noncontributory  Musculoskeletal:  Completed and, except as mentioned above, was negative or noncontributory  Neurological:  Completed and, except as mentioned above, was negative or noncontributory  Dermatological:  Completed and, except as mentioned above, was negative or noncontributory    Allergies: Tape [adhesive tape]    Current Meds:  Current Outpatient Medications:     Chlorpheniramine Maleate (ALLERGY PO), Take by mouth daily, Disp: , Rfl:     Physical Exam:  Vital signs and Nurse's note reviewed. BP (!) 146/95   Pulse 71   Temp 97.2 °F (36.2 °C)   Ht 5' 2\" (1.575 m)   Wt 209 lb (94.8 kg)   LMP 10/03/2016   BMI 38.23 kg/m²    height is 5' 2\" (1.575 m) and weight is 209 lb (94.8 kg). Her temperature is 97.2 °F (36.2 °C). Her blood pressure is 146/95 (abnormal) and her pulse is 71. Gen:  A&Ox3, NAD. Pleasant and cooperative. HEENT: PERRLA, EOMI, no scleral icterus. Multiple large scattered scalp pilar cysts varying in size from 1-3 cm. More than 10.   Neck:  no goiter  CVS: Regular rate and rhythm  Resp: Good bilateral air entry, no active wheezing, no labored breathing  Ext: Moves all extremities, no gross focal motor deficits  Skin: No erythema or ulcerations     Labs:   Lab Results   Component Value Date    WBC 8.6 04/20/2021    WBC 10.3 12/11/2019    HGB 13.7 04/20/2021    HCT 42.2 04/20/2021    MCV 86 04/20/2021     04/20/2021     12/11/2019     Lab Results   Component Value Date     04/20/2021    K 3.7 04/20/2021     04/20/2021    CO2 27 04/20/2021    BUN 10 04/20/2021    CREATININE 0.71 04/20/2021    GLUCOSE 95 04/20/2021    CALCIUM 9.0 04/20/2021     Lab Results   Component Value Date    [facility] 115 04/20/2021    ALKPHOS 111 12/11/2019    ALT 20 04/20/2021    AST 19 04/20/2021    PROT 7.0 04/20/2021    BILITOT 0.5 04/20/2021    LABALBU 4.5 04/20/2021       Impressions/Recommendations:     Multiple scattered scalp pilar cysts of varying sizes and locations. More than 10. Will re-assess in pre-op. Plan 2 hours, general anesthesia. Staples. On cart. Maximize local. Informed consent obtained. Thank you Marilyn Castillo MD for allowing me to participate in the care of your patients.      Jeff Keller DO, MPH, [facility] [place] [address] Surgery, [address] [place] [address] Rd office [phone]  [place] office [phone]

## 2021-07-29 NOTE — PROGRESS NOTES
Patient instructed on the pre-operative, intra-operative, and post-operative process. Patient's surgery arrival time to the hospital and surgery start time confirmed for the day of surgery. Patient instructed on NPO status. Medication instructions reviewed with patient. Pre operative instruction sheet reviewed and given to patient in PAT. CHG skin prep instructions reviewed with the patient via telephone. Pt instructed to stop taking all aspirin products for 1 week.

## 2021-08-09 ENCOUNTER — HOSPITAL ENCOUNTER (OUTPATIENT)
Dept: PREADMISSION TESTING | Age: 31
Setting detail: SPECIMEN
Discharge: HOME | End: 2021-08-13
Payer: COMMERCIAL

## 2021-08-09 DIAGNOSIS — Z01.818 PREOP TESTING: Primary | ICD-10-CM

## 2021-08-09 PROCEDURE — U0005 INFEC AGEN DETEC AMPLI PROBE: HCPCS

## 2021-08-09 PROCEDURE — U0003 INFECTIOUS AGENT DETECTION BY NUCLEIC ACID (DNA OR RNA); SEVERE ACUTE RESPIRATORY SYNDROME CORONAVIRUS 2 (SARS-COV-2) (CORONAVIRUS DISEASE [COVID-19]), AMPLIFIED PROBE TECHNIQUE, MAKING USE OF HIGH THROUGHPUT TECHNOLOGIES AS DESCRIBED BY CMS-2020-01-R: HCPCS

## 2021-08-09 PROCEDURE — C9803 HOPD COVID-19 SPEC COLLECT: HCPCS

## 2021-08-10 LAB
SARS-COV-2: NORMAL
SARS-COV-2: NOT DETECTED
SOURCE: NORMAL

## 2021-08-11 ENCOUNTER — ANESTHESIA EVENT (OUTPATIENT)
Dept: OPERATING ROOM | Age: 31
End: 2021-08-11
Payer: COMMERCIAL

## 2021-08-12 ENCOUNTER — HOSPITAL ENCOUNTER (OUTPATIENT)
Age: 31
Setting detail: OUTPATIENT SURGERY
Discharge: HOME | End: 2021-08-12
Attending: SURGERY | Admitting: SURGERY
Payer: COMMERCIAL

## 2021-08-12 ENCOUNTER — ANESTHESIA (OUTPATIENT)
Dept: OPERATING ROOM | Age: 31
End: 2021-08-12
Payer: COMMERCIAL

## 2021-08-12 VITALS
HEIGHT: 62 IN | RESPIRATION RATE: 16 BRPM | SYSTOLIC BLOOD PRESSURE: 144 MMHG | WEIGHT: 204.8 LBS | TEMPERATURE: 97.8 F | DIASTOLIC BLOOD PRESSURE: 87 MMHG | OXYGEN SATURATION: 100 % | BODY MASS INDEX: 37.69 KG/M2 | HEART RATE: 66 BPM

## 2021-08-12 VITALS
RESPIRATION RATE: 14 BRPM | SYSTOLIC BLOOD PRESSURE: 112 MMHG | TEMPERATURE: 98.2 F | DIASTOLIC BLOOD PRESSURE: 63 MMHG | OXYGEN SATURATION: 98 %

## 2021-08-12 DIAGNOSIS — G89.18 ACUTE POSTOPERATIVE PAIN: Primary | ICD-10-CM

## 2021-08-12 PROBLEM — L72.12 PILAR AND TRICHILEMMAL CYSTS: Status: ACTIVE | Noted: 2021-08-12

## 2021-08-12 PROBLEM — L72.11 PILAR AND TRICHILEMMAL CYSTS: Status: ACTIVE | Noted: 2021-08-12

## 2021-08-12 PROCEDURE — 2500000003 HC RX 250 WO HCPCS: Performed by: NURSE ANESTHETIST, CERTIFIED REGISTERED

## 2021-08-12 PROCEDURE — 11420 EXC H-F-NK-SP B9+MARG 0.5/<: CPT | Performed by: SURGERY

## 2021-08-12 PROCEDURE — 2580000003 HC RX 258: Performed by: SURGERY

## 2021-08-12 PROCEDURE — 11421 EXC H-F-NK-SP B9+MARG 0.6-1: CPT | Performed by: SURGERY

## 2021-08-12 PROCEDURE — 7100000010 HC PHASE II RECOVERY - FIRST 15 MIN: Performed by: SURGERY

## 2021-08-12 PROCEDURE — 7100000011 HC PHASE II RECOVERY - ADDTL 15 MIN: Performed by: SURGERY

## 2021-08-12 PROCEDURE — 3600000002 HC SURGERY LEVEL 2 BASE: Performed by: SURGERY

## 2021-08-12 PROCEDURE — 11422 EXC H-F-NK-SP B9+MARG 1.1-2: CPT | Performed by: SURGERY

## 2021-08-12 PROCEDURE — 2500000003 HC RX 250 WO HCPCS: Performed by: SURGERY

## 2021-08-12 PROCEDURE — 6360000002 HC RX W HCPCS: Performed by: SURGERY

## 2021-08-12 PROCEDURE — 2709999900 HC NON-CHARGEABLE SUPPLY: Performed by: SURGERY

## 2021-08-12 PROCEDURE — 3700000000 HC ANESTHESIA ATTENDED CARE: Performed by: SURGERY

## 2021-08-12 PROCEDURE — 6360000002 HC RX W HCPCS: Performed by: NURSE ANESTHETIST, CERTIFIED REGISTERED

## 2021-08-12 PROCEDURE — 3600000012 HC SURGERY LEVEL 2 ADDTL 15MIN: Performed by: SURGERY

## 2021-08-12 PROCEDURE — 6370000000 HC RX 637 (ALT 250 FOR IP): Performed by: SURGERY

## 2021-08-12 PROCEDURE — 88304 TISSUE EXAM BY PATHOLOGIST: CPT

## 2021-08-12 PROCEDURE — 3700000001 HC ADD 15 MINUTES (ANESTHESIA): Performed by: SURGERY

## 2021-08-12 RX ORDER — ACETAMINOPHEN 500 MG
1000 TABLET ORAL
Status: COMPLETED | OUTPATIENT
Start: 2021-08-12 | End: 2021-08-12

## 2021-08-12 RX ORDER — HYDROCODONE BITARTRATE AND ACETAMINOPHEN 5; 325 MG/1; MG/1
1 TABLET ORAL
Status: DISCONTINUED | OUTPATIENT
Start: 2021-08-12 | End: 2021-08-12 | Stop reason: HOSPADM

## 2021-08-12 RX ORDER — SODIUM CHLORIDE, SODIUM LACTATE, POTASSIUM CHLORIDE, CALCIUM CHLORIDE 600; 310; 30; 20 MG/100ML; MG/100ML; MG/100ML; MG/100ML
INJECTION, SOLUTION INTRAVENOUS CONTINUOUS
Status: DISCONTINUED | OUTPATIENT
Start: 2021-08-12 | End: 2021-08-12 | Stop reason: HOSPADM

## 2021-08-12 RX ORDER — DEXAMETHASONE SODIUM PHOSPHATE 4 MG/ML
4 INJECTION, SOLUTION INTRA-ARTICULAR; INTRALESIONAL; INTRAMUSCULAR; INTRAVENOUS; SOFT TISSUE
Status: DISCONTINUED | OUTPATIENT
Start: 2021-08-12 | End: 2021-08-12 | Stop reason: HOSPADM

## 2021-08-12 RX ORDER — IBUPROFEN 800 MG/1
800 TABLET ORAL 2 TIMES DAILY PRN
Qty: 15 TABLET | Refills: 0 | Status: SHIPPED | OUTPATIENT
Start: 2021-08-12 | End: 2021-08-12 | Stop reason: HOSPADM

## 2021-08-12 RX ORDER — KETOROLAC TROMETHAMINE 30 MG/ML
INJECTION, SOLUTION INTRAMUSCULAR; INTRAVENOUS PRN
Status: DISCONTINUED | OUTPATIENT
Start: 2021-08-12 | End: 2021-08-12 | Stop reason: SDUPTHER

## 2021-08-12 RX ORDER — PROPOFOL 10 MG/ML
INJECTION, EMULSION INTRAVENOUS CONTINUOUS PRN
Status: DISCONTINUED | OUTPATIENT
Start: 2021-08-12 | End: 2021-08-12 | Stop reason: SDUPTHER

## 2021-08-12 RX ORDER — IBUPROFEN 800 MG/1
800 TABLET ORAL 2 TIMES DAILY PRN
Qty: 20 TABLET | Refills: 0 | Status: SHIPPED | OUTPATIENT
Start: 2021-08-12

## 2021-08-12 RX ORDER — FENTANYL CITRATE 50 UG/ML
INJECTION, SOLUTION INTRAMUSCULAR; INTRAVENOUS PRN
Status: DISCONTINUED | OUTPATIENT
Start: 2021-08-12 | End: 2021-08-12 | Stop reason: SDUPTHER

## 2021-08-12 RX ORDER — FENTANYL CITRATE 50 UG/ML
50 INJECTION, SOLUTION INTRAMUSCULAR; INTRAVENOUS EVERY 5 MIN PRN
Status: DISCONTINUED | OUTPATIENT
Start: 2021-08-12 | End: 2021-08-12 | Stop reason: HOSPADM

## 2021-08-12 RX ORDER — PROPOFOL 10 MG/ML
INJECTION, EMULSION INTRAVENOUS PRN
Status: DISCONTINUED | OUTPATIENT
Start: 2021-08-12 | End: 2021-08-12 | Stop reason: SDUPTHER

## 2021-08-12 RX ORDER — BUPIVACAINE HYDROCHLORIDE AND EPINEPHRINE 2.5; 5 MG/ML; UG/ML
INJECTION, SOLUTION EPIDURAL; INFILTRATION; INTRACAUDAL; PERINEURAL PRN
Status: DISCONTINUED | OUTPATIENT
Start: 2021-08-12 | End: 2021-08-12 | Stop reason: ALTCHOICE

## 2021-08-12 RX ORDER — ONDANSETRON 2 MG/ML
4 INJECTION INTRAMUSCULAR; INTRAVENOUS
Status: DISCONTINUED | OUTPATIENT
Start: 2021-08-12 | End: 2021-08-12 | Stop reason: HOSPADM

## 2021-08-12 RX ORDER — MAGNESIUM HYDROXIDE 1200 MG/15ML
LIQUID ORAL CONTINUOUS PRN
Status: COMPLETED | OUTPATIENT
Start: 2021-08-12 | End: 2021-08-12

## 2021-08-12 RX ORDER — HYDROCODONE BITARTRATE AND ACETAMINOPHEN 5; 325 MG/1; MG/1
1 TABLET ORAL EVERY 8 HOURS PRN
Qty: 9 TABLET | Refills: 0 | Status: SHIPPED | OUTPATIENT
Start: 2021-08-12 | End: 2021-08-15

## 2021-08-12 RX ORDER — LIDOCAINE HYDROCHLORIDE 20 MG/ML
INJECTION, SOLUTION EPIDURAL; INFILTRATION; INTRACAUDAL; PERINEURAL PRN
Status: DISCONTINUED | OUTPATIENT
Start: 2021-08-12 | End: 2021-08-12 | Stop reason: SDUPTHER

## 2021-08-12 RX ADMIN — ACETAMINOPHEN 1000 MG: 500 TABLET, FILM COATED ORAL at 12:43

## 2021-08-12 RX ADMIN — SODIUM CHLORIDE, POTASSIUM CHLORIDE, SODIUM LACTATE AND CALCIUM CHLORIDE: 600; 310; 30; 20 INJECTION, SOLUTION INTRAVENOUS at 10:35

## 2021-08-12 RX ADMIN — FENTANYL CITRATE 25 MCG: 50 INJECTION, SOLUTION INTRAMUSCULAR; INTRAVENOUS at 11:38

## 2021-08-12 RX ADMIN — LIDOCAINE HYDROCHLORIDE 50 MG: 20 INJECTION, SOLUTION EPIDURAL; INFILTRATION; INTRACAUDAL; PERINEURAL at 11:25

## 2021-08-12 RX ADMIN — PROPOFOL 60 MG: 10 INJECTION, EMULSION INTRAVENOUS at 11:25

## 2021-08-12 RX ADMIN — FENTANYL CITRATE 25 MCG: 50 INJECTION, SOLUTION INTRAMUSCULAR; INTRAVENOUS at 11:34

## 2021-08-12 RX ADMIN — CEFAZOLIN 2000 MG: 10 INJECTION, POWDER, FOR SOLUTION INTRAVENOUS at 11:19

## 2021-08-12 RX ADMIN — KETOROLAC TROMETHAMINE 30 MG: 30 INJECTION, SOLUTION INTRAMUSCULAR; INTRAVENOUS at 12:11

## 2021-08-12 RX ADMIN — PROPOFOL 180 MCG/KG/MIN: 10 INJECTION, EMULSION INTRAVENOUS at 11:25

## 2021-08-12 RX ADMIN — FENTANYL CITRATE 50 MCG: 50 INJECTION, SOLUTION INTRAMUSCULAR; INTRAVENOUS at 11:25

## 2021-08-12 ASSESSMENT — PAIN SCALES - GENERAL
PAINLEVEL_OUTOF10: 0
PAINLEVEL_OUTOF10: 6
PAINLEVEL_OUTOF10: 6

## 2021-08-12 ASSESSMENT — PAIN DESCRIPTION - LOCATION: LOCATION: HEAD

## 2021-08-12 ASSESSMENT — PAIN DESCRIPTION - DESCRIPTORS: DESCRIPTORS: ACHING

## 2021-08-12 NOTE — ANESTHESIA PRE PROCEDURE
Department of Anesthesiology  Preprocedure Note       Name:  Aurora Carnes   Age:  32 y.o.  :  1990                                          MRN:  [id number]         Date:  2021      Surgeon: Katiuska Chew):  Nestor Lei DO    Procedure: Procedure(s): HEAD LESION EXCISION, SCALP CYST, MULTIPLE    Medications prior to admission:   Prior to Admission medications    Medication Sig Start Date End Date Taking? Authorizing Provider   HYDROcodone-acetaminophen (NORCO) 5-325 MG per tablet Take 1 tablet by mouth every 8 hours as needed for Pain for up to 3 days. Intended supply: 3 days. Take lowest dose possible to manage pain 8/12/21 8/15/21 Yes Cleopatra I Nazemi, DO   ibuprofen (ADVIL;MOTRIN) 800 MG tablet Take 1 tablet by mouth 2 times daily as needed for Pain (take with food, alternate with norco as needed) 21  Yes Cleopatra I Nazemi, DO   Chlorpheniramine Maleate (ALLERGY PO) Take by mouth daily   Yes Historical Provider, MD       Current medications:    Current Facility-Administered Medications   Medication Dose Route Frequency Provider Last Rate Last Admin    ceFAZolin (ANCEF) 2000 mg in dextrose 5 % 100 mL IVPB  2,000 mg Intravenous Once Cleopatra I Nazemi, DO        lactated ringers infusion   Intravenous Continuous Cleopatra I Nazemi,  mL/hr at 21 1035 New Bag at 21 1035       Allergies:     Allergies   Allergen Reactions    Tape [place] [employer] Tape]      Paper tape       Problem List:    Patient Active Problem List   Diagnosis Code    Rectal bleeding K62.5    Internal hemorrhoids K64.8    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 Tiffanie Marcial ovaries present    KNEE SURGERY      Right       Social History:    Social History     Tobacco Use    Smoking status: Never Smoker    Smokeless tobacco: Never Used   Substance Use Topics    Alcohol use: Yes     Alcohol/week: 0.0 standard drinks     Comment: occ                                Counseling given: Not Answered      Vital Signs (Current):   Vitals:    08/12/21 1012 08/12/21 1015   BP:  (!) 127/93   Pulse:  69   Resp:  16   Temp:  35.9 °C (96.7 °F)   TempSrc:  Temporal   SpO2:  97%   Weight: 204 lb 12.8 oz (92.9 kg)    Height: 5' 2\" (1.575 m)                                               BP Readings from Last 3 Encounters:   08/12/21 (!) 127/93   07/14/21 (!) 146/95   04/29/21 122/64       NPO Status: Time of last liquid consumption: 2220                        Time of last solid consumption: 2220                        Date of last liquid consumption: 08/11/21                        Date of last solid food consumption: 08/11/21    BMI:   Wt Readings from Last 3 Encounters:   08/12/21 204 lb 12.8 oz (92.9 kg)   07/14/21 209 lb (94.8 kg)   04/29/21 213 lb (96.6 kg)     Body mass index is 37.46 kg/m².     CBC:   Lab Results   Component Value Date    WBC 8.6 04/20/2021    WBC 10.3 12/11/2019    RBC 4.89 04/20/2021    HGB 13.7 04/20/2021    HCT 42.2 04/20/2021    MCV 86 04/20/2021    RDW 14.0 04/20/2021     04/20/2021     12/11/2019       CMP:   Lab Results   Component Value Date     04/20/2021    K 3.7 04/20/2021     04/20/2021    CO2 27 04/20/2021    BUN 10 04/20/2021    CREATININE 0.71 04/20/2021    GFRAA >60 12/11/2019    LABGLOM >60 12/11/2019    GLUCOSE 95 04/20/2021    PROT 7.0 04/20/2021    CALCIUM 9.0 04/20/2021    BILITOT 0.5 04/20/2021    ALKPHOS 115 04/20/2021    ALKPHOS 111 12/11/2019    AST 19 04/20/2021    ALT 20 04/20/2021       POC Tests: No results for input(s): POCGLU, POCNA, POCK, POCCL, POCBUN, POCHEMO, POCHCT in the last 72 hours. Coags: No results found for: PROTIME, INR, APTT    HCG (If Applicable):   Lab Results   Component Value Date    PREGTESTUR NEGATIVE 10/17/2016        ABGs: No results found for: PHART, PO2ART, CRR4GKC, UOU0ZHO, BEART, D0OOWFBI     Type & Screen (If Applicable):  No results found for: LABABO, LABRH    Drug/Infectious Status (If Applicable):  No results found for: HIV, HEPCAB    COVID-19 Screening (If Applicable):   Lab Results   Component Value Date    COVID19 Not Detected 08/09/2021           Anesthesia Evaluation  Patient summary reviewed and Nursing notes reviewed no history of anesthetic complications:   Airway: Mallampati: III  TM distance: >3 FB   Neck ROM: full  Mouth opening: > = 3 FB Dental: normal exam         Pulmonary:Negative Pulmonary ROS and normal exam  breath sounds clear to auscultation                             Cardiovascular:Negative CV ROS  Exercise tolerance: good (>4 METS),           Rhythm: regular  Rate: normal           Beta Blocker:  Not on Beta Blocker         Neuro/Psych:   Negative Neuro/Psych ROS              GI/Hepatic/Renal:   (+) morbid obesity          Endo/Other: Negative Endo/Other ROS                    Abdominal:   (+) obese,           Vascular: negative vascular ROS. Other Findings:             Anesthesia Plan      general     ASA 2       Induction: intravenous. Anesthetic plan and risks discussed with patient. Plan discussed with CRNA.                   GITA Gibbs - LUCILA   8/12/2021

## 2021-08-12 NOTE — ANESTHESIA POSTPROCEDURE EVALUATION
Department of Anesthesiology  Postprocedure Note    Patient: Angela Grace  MRN: [id number]  YOB: 1990  Date of evaluation: 8/12/2021  Time:  3:28 PM     Procedure Summary     Date: 08/12/21 Room / Location: [address] [place] [address] / [facility]    Anesthesia Start: 7721 Anesthesia Stop: 3483    Procedure: HEAD LESION EXCISION, SCALP CYST, MULTIPLE (N/A ) Diagnosis: (SCALP CYST)    Surgeons: Elihue Olszewski, DO Responsible Provider: GITA Gilbert CRNA    Anesthesia Type: general ASA Status: 2          Anesthesia Type: general    Rosario Phase I: Rosario Score: 10    Rosario Phase II: Rosario Score: 10    Last vitals: Reviewed and per EMR flowsheets.        Anesthesia Post Evaluation    Patient location during evaluation: PACU  Patient participation: complete - patient participated  Level of consciousness: awake and alert  Pain score: 0  Airway patency: patent  Nausea & Vomiting: no nausea and no vomiting  Complications: no  Cardiovascular status: blood pressure returned to baseline  Respiratory status: acceptable and room air  Hydration status: stable

## 2021-08-12 NOTE — OP NOTE
Operative Note        Patient: Daja Carey  YOB: 1990  MRN: Gary Fontanez MD      Date of Procedure: 8/12/2021    Pre-Op Diagnosis: multiple scalp/pilar cysts causing pain and discomfort    Post-Op Diagnosis: Same       Procedure(s):  Sharp simple excisional biopsy of pilar cysts of scalp x 8 down to subcutaneous tissue, ranging in size:  Sizes: 1 of 8 cyst- 13 mm diameter  5 of 8  cysts- 10 cm diameter  72 of 8 cysts - 5 mm diameter     Surgeon(s):  Cleopatra Pablo DO    Anesthesia: General + local    Estimated Blood Loss (mL): 5 ml    Complications: None    Specimens:   8 cysts in aggregate submitted    Counts: correct    Please see H&P. Pre-op IV antibiotics given. Sites inspected/marked in pre-op. Patient brought to OR suite, placed under anesthesia, positioned appropriately, surgical pause performed, site prepped and draped in sterile fashion. Local anesthetic infiltrated into all marked/noted cysts of scalp 15 blade used to sharply excise all cysts including capsules down to subcutaneous layer. Measurements taken, the following are submitted in aggregate:  Sizes:   1 of 8 cysts- 13 mm diameter  5 of 8  cysts- 10 cm diameter  2 of 8 cysts - 5 mm diameter     Thorough irrigation of all sites performed. All incisions closed with small skin staples for better skin approximation and ease of removal next week. Tolerated well. Spoke with mother afterwards.      Electronically signed by Marquita Hirsch DO, FACOS, FACS on 8/12/2021 at 3:03 PM

## 2021-08-12 NOTE — PROGRESS NOTES
Discharge instructions given to patient and mother with understanding voiced. No questions asked at this time.

## 2021-08-12 NOTE — H&P
GENERAL SURGERY CONSULTATION        Patient's Name/ Date of Birth/ Gender: Danica Pineda / 1990 (32 y.o.) / female      PCP: Osiel Cagle MD  Referring:      History of present Illness:  Patient is a pleasant 32 y.o. female  kindly referred by Osiel Cagle MD   Multiple painful, enlarging pilar scalp cysts. Genetic predisposition, runs in the family. No signs of infection. Painful, bothersome. Active healthy 31 yo.      Past Medical History:  has a past medical history of ASCUS with positive high risk HPV.     Past Surgical History:   Past Surgical History         Past Surgical History:   Procedure Laterality Date    COLONOSCOPY N/A 12/30/2019     COLONOSCOPY DIAGNOSTIC performed by Jasiel Perez MD at [place] [address]   12/30/2019     -hemorrhoids    COLPOSCOPY   8/14/2012    HYSTERECTOMY   04/2018     Dr. Becker Delay ovaries present    KNEE SURGERY         Right            Social History:  reports that she has never smoked. She has never used smokeless tobacco. She reports current alcohol use.  She reports that she does not use drugs.     Family History: family history includes Breast Cancer in her maternal grandmother and mother.     Review of Systems:   General: Completed and, except as mentioned above, was negative or noncontributory  Psychological:  Completed and, except as mentioned above, was negative or noncontributory  Ophthalmic:  Completed and, except as mentioned above, was negative or noncontributory  ENT:  Completed and, except as mentioned above, was negative or noncontributory  Allergy and Immunology:  Completed and, except as mentioned above, was negative or noncontributory  Hematological and Lymphatic:  Completed and, except as mentioned above, was negative or noncontributory  Endocrine: Completed and, except as mentioned above, was negative or noncontributory  Breast:  Completed and, except as mentioned above, was negative or noncontributory  Respiratory:  Completed and, except as mentioned above, was negative or noncontributory  Cardiovascular:  Completed and, except as mentioned above, was negative or noncontributory  Gastrointestinal: Completed and, except as mentioned above, was negative or noncontributory  Genito-Urinary:  Completed and, except as mentioned above, was negative or noncontributory  Musculoskeletal:  Completed and, except as mentioned above, was negative or noncontributory  Neurological:  Completed and, except as mentioned above, was negative or noncontributory  Dermatological:  Completed and, except as mentioned above, was negative or noncontributory     Allergies: Tape [adhesive tape]     Current Meds:  Current Medication   Current Outpatient Medications:     Chlorpheniramine Maleate (ALLERGY PO), Take by mouth daily, Disp: , Rfl:         Physical Exam:  Vital signs and Nurse's note reviewed. BP (!) 146/95   Pulse 71   Temp 97.2 °F (36.2 °C)   Ht 5' 2\" (1.575 m)   Wt 209 lb (94.8 kg)   LMP 10/03/2016   BMI 38.23 kg/m²    height is 5' 2\" (1.575 m) and weight is 209 lb (94.8 kg). Her temperature is 97.2 °F (36.2 °C). Her blood pressure is 146/95 (abnormal) and her pulse is 71. Gen:  A&Ox3, NAD. Pleasant and cooperative. HEENT: PERRLA, EOMI, no scleral icterus. Multiple large scattered scalp pilar cysts varying in size from 1-3 cm. More than 10.   Neck:  no goiter  CVS: Regular rate and rhythm  Resp: Good bilateral air entry, no active wheezing, no labored breathing  Ext: Moves all extremities, no gross focal motor deficits  Skin: No erythema or ulcerations      Labs:         Lab Results   Component Value Date     WBC 8.6 04/20/2021     WBC 10.3 12/11/2019     HGB 13.7 04/20/2021     HCT 42.2 04/20/2021     MCV 86 04/20/2021      04/20/2021      12/11/2019            Lab Results   Component Value Date      04/20/2021     K 3.7 04/20/2021      04/20/2021     CO2 27 04/20/2021     BUN 10 04/20/2021     CREATININE 0.71 04/20/2021     GLUCOSE 95 04/20/2021     CALCIUM 9.0 04/20/2021            Lab Results   Component Value Date     ALKPHOS 115 04/20/2021     ALKPHOS 111 12/11/2019     ALT 20 04/20/2021     AST 19 04/20/2021     PROT 7.0 04/20/2021     BILITOT 0.5 04/20/2021     LABALBU 4.5 04/20/2021         Impressions/Recommendations:      Multiple scattered scalp pilar cysts of varying sizes and locations. More than 10. Will re-assess in pre-op. Plan 2 hours, general anesthesia. Staples. On cart. Maximize local. Informed consent obtained.       H&P  General Surgery        Pt Name: Aurora Carens  MRN: [id number]  [place]: 1990  Date of evaluation: 8/12/2021      [x] I have examined the patient and reviewed the H&P/Consult completed, and there are no changes to the patient or plans. [] I have examined the patient and reviewed the H&P/Consult and have noted the following changes: The patient was counseled at length about the risks of swapnil Covid-19 during their perioperative period and any recovery window from their procedure. The patient was made aware that swapnil Covid-19  may worsen their prognosis for recovering from their procedure  and lend to a higher morbidity and/or mortality risk. All material risks, benefits, and reasonable alternatives including postponing the procedure were discussed. The patient does wish to proceed with the procedure at this time.         Electronically signed by Nestor Lei DO  on 8/12/2021 at 10:18 AM                       Of

## 2021-08-12 NOTE — PROGRESS NOTES

## 2021-08-12 NOTE — BRIEF OP NOTE
Brief Postoperative Note      Patient: Leti Arteaga  YOB: 1990  MRN: Marcelino Vazquez MD      Date of Procedure: 8/12/2021    Pre-Op Diagnosis: multiple scalp/pilar cysts causing pain and discomfort    Post-Op Diagnosis: Same       Procedure(s):  Sharp simple excisional biopsy of pilar cysts of scalp x 8 down to subcutaneous tissue, ranging in size:  Sizes: 1 of 8 cyst- 13 mm diameter  5 of 8  cysts- 10 cm diameter  72 of 8 cysts - 5 mm diameter     Surgeon(s):  Cleopatra Pablo DO    Anesthesia: General + local    Estimated Blood Loss (mL): 5 ml    Complications: None    Specimens:   8 cysts in aggregate submitted    Counts: correct    Electronically signed by Chyna Awad DO, SUSU, FACS on 8/12/2021 at 3:03 PM

## 2021-08-16 LAB — DERMATOLOGY PATHOLOGY REPORT: NORMAL

## 2021-08-25 ENCOUNTER — OFFICE VISIT (OUTPATIENT)
Dept: SURGERY | Age: 31
End: 2021-08-25
Payer: COMMERCIAL

## 2021-08-25 VITALS — HEART RATE: 75 BPM | RESPIRATION RATE: 18 BRPM

## 2021-08-25 DIAGNOSIS — Z09 POSTOP CHECK: Primary | ICD-10-CM

## 2021-08-25 PROCEDURE — 99024 POSTOP FOLLOW-UP VISIT: CPT | Performed by: SURGERY

## 2022-04-20 ENCOUNTER — TELEPHONE (OUTPATIENT)
Dept: FAMILY MEDICINE CLINIC | Age: 32
End: 2022-04-20

## 2022-04-20 ASSESSMENT — PATIENT HEALTH QUESTIONNAIRE - PHQ9
SUM OF ALL RESPONSES TO PHQ QUESTIONS 1-9: 0
1. LITTLE INTEREST OR PLEASURE IN DOING THINGS: 0
SUM OF ALL RESPONSES TO PHQ QUESTIONS 1-9: 0
2. FEELING DOWN, DEPRESSED OR HOPELESS: 0
SUM OF ALL RESPONSES TO PHQ9 QUESTIONS 1 & 2: 0

## 2022-08-16 ENCOUNTER — HOSPITAL ENCOUNTER (OUTPATIENT)
Age: 32
Discharge: HOME | End: 2022-08-18
Payer: COMMERCIAL

## 2022-08-16 ENCOUNTER — HOSPITAL ENCOUNTER (OUTPATIENT)
Dept: GENERAL RADIOLOGY | Age: 32
Discharge: HOME | End: 2022-08-18
Payer: COMMERCIAL

## 2022-08-16 DIAGNOSIS — M25.541 PAIN INVOLVING JOINT OF FINGER OF RIGHT HAND: ICD-10-CM

## 2022-08-16 PROCEDURE — 73140 X-RAY EXAM OF FINGER(S): CPT

## 2022-11-02 PROBLEM — N93.0 POSTCOITAL AND CONTACT BLEEDING: Status: ACTIVE | Noted: 2019-05-02

## 2022-11-02 PROBLEM — N94.10 UNSPECIFIED DYSPAREUNIA (CODE): Status: ACTIVE | Noted: 2019-05-02

## 2022-11-02 PROBLEM — M25.569 KNEE PAIN: Status: ACTIVE | Noted: 2022-11-02

## 2022-11-02 PROBLEM — Z12.4 ENCOUNTER FOR SCREENING FOR MALIGNANT NEOPLASM OF CERVIX: Status: ACTIVE | Noted: 2019-01-08

## 2022-11-02 PROBLEM — Z87.42 PERSONAL HISTORY OF OTHER DISEASES OF THE FEMALE GENITAL TRACT: Status: ACTIVE | Noted: 2019-01-23

## 2022-11-02 PROBLEM — N73.6 FEMALE PELVIC PERITONEAL ADHESIONS (POSTINFECTIVE): Status: ACTIVE | Noted: 2019-05-02

## 2022-11-02 PROBLEM — E28.2 POLYCYSTIC OVARIAN SYNDROME: Status: ACTIVE | Noted: 2019-05-02

## 2022-11-02 PROBLEM — Z11.51 ENCOUNTER FOR SCREENING FOR HUMAN PAPILLOMAVIRUS (HPV): Status: ACTIVE | Noted: 2019-01-13

## 2022-11-02 PROBLEM — R10.2 PELVIC AND PERINEAL PAIN: Status: ACTIVE | Noted: 2019-01-23

## 2022-11-02 PROBLEM — N92.1 EXCESSIVE, FREQUENT AND IRREGULAR MENSTRUATION: Status: ACTIVE | Noted: 2019-01-19

## 2022-12-02 PROBLEM — Z12.4 ENCOUNTER FOR SCREENING FOR MALIGNANT NEOPLASM OF CERVIX: Status: RESOLVED | Noted: 2019-01-08 | Resolved: 2022-12-02

## 2022-12-02 PROBLEM — Z11.51 ENCOUNTER FOR SCREENING FOR HUMAN PAPILLOMAVIRUS (HPV): Status: RESOLVED | Noted: 2019-01-13 | Resolved: 2022-12-02

## 2024-01-26 ENCOUNTER — HOSPITAL ENCOUNTER (OUTPATIENT)
Age: 34
Discharge: HOME | End: 2024-01-26
Payer: COMMERCIAL

## 2024-01-26 LAB
ANION GAP SERPL CALCULATED.3IONS-SCNC: 9 MMOL/L (ref 9–17)
BASOPHILS # BLD: 0.07 K/UL (ref 0–0.2)
BASOPHILS NFR BLD: 1 % (ref 0–2)
BUN SERPL-MCNC: 10 MG/DL (ref 6–20)
BUN/CREAT SERPL: 17 (ref 9–20)
CALCIUM SERPL-MCNC: 9 MG/DL (ref 8.6–10.4)
CHLORIDE SERPL-SCNC: 105 MMOL/L (ref 98–107)
CO2 SERPL-SCNC: 28 MMOL/L (ref 20–31)
CREAT SERPL-MCNC: 0.6 MG/DL (ref 0.5–0.9)
EOSINOPHIL # BLD: 0.4 K/UL (ref 0–0.44)
EOSINOPHILS RELATIVE PERCENT: 5 % (ref 1–4)
ERYTHROCYTE [DISTWIDTH] IN BLOOD BY AUTOMATED COUNT: 12.7 % (ref 11.8–14.4)
GFR SERPL CREATININE-BSD FRML MDRD: >60 ML/MIN/1.73M2
GLUCOSE SERPL-MCNC: 96 MG/DL (ref 70–99)
HCT VFR BLD AUTO: 41.8 % (ref 36.3–47.1)
HGB BLD-MCNC: 14.1 G/DL (ref 11.9–15.1)
IMM GRANULOCYTES # BLD AUTO: <0.03 K/UL (ref 0–0.3)
IMM GRANULOCYTES NFR BLD: 0 %
LYMPHOCYTES NFR BLD: 2.28 K/UL (ref 1.1–3.7)
LYMPHOCYTES RELATIVE PERCENT: 29 % (ref 24–43)
MCH RBC QN AUTO: 29.6 PG (ref 25.2–33.5)
MCHC RBC AUTO-ENTMCNC: 33.7 G/DL (ref 28.4–34.8)
MCV RBC AUTO: 87.6 FL (ref 82.6–102.9)
MONOCYTES NFR BLD: 0.65 K/UL (ref 0.1–1.2)
MONOCYTES NFR BLD: 8 % (ref 3–12)
NEUTROPHILS NFR BLD: 57 % (ref 36–65)
NEUTS SEG NFR BLD: 4.38 K/UL (ref 1.5–8.1)
NRBC BLD-RTO: 0 PER 100 WBC
PLATELET # BLD AUTO: 329 K/UL (ref 138–453)
PMV BLD AUTO: 10.7 FL (ref 8.1–13.5)
POTASSIUM SERPL-SCNC: 4.1 MMOL/L (ref 3.7–5.3)
RBC # BLD AUTO: 4.77 M/UL (ref 3.95–5.11)
SODIUM SERPL-SCNC: 142 MMOL/L (ref 135–144)
WBC OTHER # BLD: 7.8 K/UL (ref 3.5–11.3)

## 2024-01-26 PROCEDURE — 85025 COMPLETE CBC W/AUTO DIFF WBC: CPT

## 2024-01-26 PROCEDURE — 80048 BASIC METABOLIC PNL TOTAL CA: CPT

## 2024-01-26 PROCEDURE — 36415 COLL VENOUS BLD VENIPUNCTURE: CPT

## (undated) DEVICE — MEDI-VAC NON-CONDUCTIVE TUBING7MM X 30.5 (100FT): Brand: CARDINAL HEALTH

## (undated) DEVICE — TUBING, SUCTION, 9/32" X 12', STRAIGHT: Brand: MEDLINE INDUSTRIES, INC.

## (undated) DEVICE — BASIC PACK: Brand: MEDLINE INDUSTRIES, INC.

## (undated) DEVICE — SKIN AFFIX SURG ADHESIVE 72/CS 0.55ML: Brand: MEDLINE

## (undated) DEVICE — GAMMEX® NON-LATEX PI ORTHO SIZE 7, STERILE POLYISOPRENE POWDER-FREE SURGICAL GLOVE: Brand: GAMMEX

## (undated) DEVICE — PENCIL ES L3M BTTN SWCH HOLSTER W/ BLDE ELECTRD EDGE

## (undated) DEVICE — SYRINGE EAR 2OZ ULC SLIMMER TIP FLAT BTM SUCT PWR DISP FOR

## (undated) DEVICE — YANKAUER,BULB TIP,W/O VENT,RIGID,STERILE: Brand: MEDLINE

## (undated) DEVICE — GOWN,SIRUS,POLYRNF,BRTHSLV,LG,30/CS: Brand: MEDLINE

## (undated) DEVICE — HYPODERMIC SAFETY NEEDLE: Brand: MAGELLAN

## (undated) DEVICE — SOLUTION SCRB 4OZ 4% CHG CLN BASE FOR PT SKIN ANTISEPSIS

## (undated) DEVICE — SOLUTION IV IRRIG 1000ML POUR BTL 2F7114

## (undated) DEVICE — DRAPE PED 77INX108IN REINF FENEST ARMBRD

## (undated) DEVICE — SOLUTION IV IRRIG POUR BRL 0.9% SODIUM CHL 2F7124

## (undated) DEVICE — INTENDED FOR TISSUE SEPARATION, AND OTHER PROCEDURES THAT REQUIRE A SHARP SURGICAL BLADE TO PUNCTURE OR CUT.: Brand: BARD-PARKER ® CARBON RIB-BACK BLADES

## (undated) DEVICE — SYRINGE MED 10ML LUERLOCK TIP W/O SFTY DISP

## (undated) DEVICE — NEEDLE,25GX1.5",REG,BEVEL: Brand: MEDLINE

## (undated) DEVICE — CANNULA ORAL NSL AD CO2 N INTUB O2 DEL DISP TRU LNK

## (undated) DEVICE — Z DISCONTINUED BY MEDLINE USE 2711682 TRAY SKIN PREP DRY W/ PREM GLV

## (undated) DEVICE — ELECTRODE ES AD CRD L15FT DISP FOR PT BELOW 30LB REM